# Patient Record
Sex: FEMALE | NOT HISPANIC OR LATINO | Employment: UNEMPLOYED | URBAN - METROPOLITAN AREA
[De-identification: names, ages, dates, MRNs, and addresses within clinical notes are randomized per-mention and may not be internally consistent; named-entity substitution may affect disease eponyms.]

---

## 2022-09-29 DIAGNOSIS — Z00.6 EXAMINATION OF PARTICIPANT OR CONTROL IN CLINICAL RESEARCH: Primary | ICD-10-CM

## 2022-12-28 ENCOUNTER — RESEARCH ENCOUNTER (OUTPATIENT)
Dept: CARDIOLOGY | Facility: CLINIC | Age: 62
End: 2022-12-28

## 2022-12-28 DIAGNOSIS — Z00.6 EXAMINATION OF PARTICIPANT OR CONTROL IN CLINICAL RESEARCH: Primary | ICD-10-CM

## 2023-01-04 NOTE — PROGRESS NOTES
COVID CVD    Study Title: Immunologic Basis of Cardiovascular Disease after COVID-19    IRB: [id number]  PI: Dr. Ion Scott Pager:[phone]  Research Nurse Coordinator: Jada Perdomo RN - Phone: [phone] Pager: [phone]  : MERE Gusman - Phone: [phone]     Patient was approached for possible participation for the above study. Inclusion and exclusion criteria reviewed. Patient meets all of the inclusion criteria and does not meet any of the exclusion criteria. The current approved IRB consent form was discussed and explained to the patient.  It was discussed that involvement with the study is voluntary and refusal to participate would not involve penalty or decrease benefits at which the patient is entitled, and the subject may discontinue involvement at any time without penalty or loss in benefits. The consent form was reviewed including purpose, research hypothesis, nature of the research, risk & benefits. Also reviewed were confidentiality issues, compensation for injury, and alternative procedures available. The patient was given time to review and ask any questions about the consent. Patient was shown contact information for PI and study staff in consent for future questions. Patient verbalized understanding of consent and study by restating the purpose, procedures, duration, risk, confidentiality of PHI, and voluntarily participation. Questions and concerns were addressed. Patient printed, signed and dated the consent/HIPAA form prior to study involvement. A copy was given to the patient for their records.     Subject Consent/HIPAA: SIGNED ON 28 Dec 2022    Vicki Alcocer    When participant was called for the study qualifying MRI questions were asked and participant stated NO to all the questions.  When taken back for the MRI participant stated that they have a contrast allergy but could not remember to what.  Dr Scott was consulted and decided to withdraw the  participant from the study.